# Patient Record
Sex: MALE
[De-identification: names, ages, dates, MRNs, and addresses within clinical notes are randomized per-mention and may not be internally consistent; named-entity substitution may affect disease eponyms.]

---

## 2019-10-07 ENCOUNTER — HOSPITAL ENCOUNTER (EMERGENCY)
Dept: HOSPITAL 97 - ER | Age: 44
Discharge: HOME | End: 2019-10-07
Payer: COMMERCIAL

## 2019-10-07 DIAGNOSIS — M10.9: Primary | ICD-10-CM

## 2019-10-07 PROCEDURE — 99283 EMERGENCY DEPT VISIT LOW MDM: CPT

## 2019-10-07 NOTE — ER
Nurse's Notes                                                                                     

 [facility]                                                                 

Name: Scar Castillo                                                                       

Age: 44 yrs                                                                                       

Sex: Male                                                                                         

: 1975                                                                                   

MRN: [id number]                                                                                   

Arrival Date: 10/07/2019                                                                          

Time: 14:08                                                                                       

Account#: [id number]                                                                            

Bed 9                                                                                             

Private MD:                                                                                       

Diagnosis: Gout                                                                                   

                                                                                                  

Presentation:                                                                                     

10/07                                                                                             

14:13 Presenting complaint: Patient states: right elbow swelling started Saturday; went to    [facility] ER and sent home with scripts but pain has not subsided. Transition of care:       

      patient was not received from another setting of care. Onset of symptoms was 2019. Risk Assessment: Do you want to hurt yourself or someone else? Patient            

      reports no desire to harm self or others. Care prior to arrival: None.                      

14:13 Method Of Arrival: Ambulatory                                                             

14:13 Acuity: BANDAR 4                                                                             

14:15 Initial Sepsis Screen: Does the patient meet any 2 criteria? No. Patient's initial      iw  

      sepsis screen is negative. Does the patient have a suspected source of infection? No.       

      Patient's initial sepsis screen is negative.                                                

                                                                                                  

Triage Assessment:                                                                                

14:15 General: Appears in no apparent distress. uncomfortable, Behavior is calm, cooperative, sv  

      appropriate for age. Pain: Complains of pain in right elbow. Neuro: Level of                

      Consciousness is awake, alert, obeys commands, Gait is steady. Respiratory: Respiratory     

      effort is even, unlabored.                                                                  

                                                                                                  

Historical:                                                                                       

- Allergies:                                                                                      

14:14 No Known Allergies;                                                                     sv  

- PMHx:                                                                                           

14:14 Gout;                                                                                   sv  

                                                                                                  

- Immunization history:: Adult Immunizations unknown.                                             

- Social history:: Smoking status: unknown.                                                       

- Ebola Screening: : Patient negative for fever greater than or equal to 101.5 degrees            

  Fahrenheit, and additional compatible Ebola Virus Disease symptoms Patient denies               

  exposure to infectious person Patient denies travel to an Ebola-affected area in the            

  21 days before illness onset No symptoms or risks identified at this time.                      

                                                                                                  

                                                                                                  

Screening:                                                                                        

15:38 Abuse screen: Denies threats or abuse. Denies injuries from another. Nutritional        iw  

      screening: No deficits noted. Tuberculosis screening: No symptoms or risk factors           

      identified. Fall Risk None identified.                                                      

                                                                                                  

Assessment:                                                                                       

15:00 General: Appears in no apparent distress. Behavior is calm, cooperative. Pain:          iw  

      Complains of pain in right elbow. Neuro: Level of Consciousness is awake, alert, obeys      

      commands, Oriented to person, place, time, situation, Moves all extremities.                

      Cardiovascular: Patient's skin is warm and dry. Respiratory: Respiratory effort is          

      even, unlabored, Respiratory pattern is regular. Derm: Skin is intact, is healthy with      

      good turgor. Musculoskeletal: Swelling present in right elbow.                              

                                                                                                  

Vital Signs:                                                                                      

14:14  / 86; Pulse 85; Resp 16; Temp 98.1; Pulse Ox 98% ; Weight 83.91 kg; Height 5 ft. sv  

      4 in. (162.56 cm);                                                                          

14:14 Body Mass Index 31.75 (83.91 kg, 162.56 cm)                                             sv  

                                                                                                  

ED Course:                                                                                        

14:08 Patient arrived in ED.                                                                  mr  

14:14 Triage completed.                                                                       sv  

14:14 Arm band placed on.                                                                     sv  

14:31 Neyda Silvestre, RN is Primary Nurse.                                                   iw  

14:38 Prasanna Botello PA is PHCP.                                                               jr8 

14:39 Carlos Bailon MD is Attending Physician.                                              jr8 

15:00 Patient has correct armband on for positive identification.                             iw  

15:38 No provider procedures requiring assistance completed. Patient did not have IV access   iw  

      during this emergency room visit.                                                           

                                                                                                  

Administered Medications:                                                                         

15:39 Drug: Indomethacin 50 mg Route: PO;                                                     jr8 

                                                                                                  

                                                                                                  

Outcome:                                                                                          

15:03 Discharge ordered by MD.                                                                jr8 

15:38 Discharged to home ambulatory.                                                          iw  

15:38 Condition: good                                                                             

15:38 Discharge instructions given to patient, Instructed on discharge instructions, follow       

      up and referral plans. Demonstrated understanding of instructions, follow-up care,          

      medications, Prescriptions given X 2.                                                       

15:39 Patient left the ED.                                                                    jr8 

                                                                                                  

Signatures:                                                                                       

Qing Parsons RN RN                                                      

Dilia Peterson                                 mr                                                   

Neyda Silvestre RN RN                                                      

Prasanna Botello PA PA   jr8                                                  

                                                                                                  

**************************************************************************************************

## 2019-10-07 NOTE — EDPHYS
Physician Documentation                                                                           

 [facility]                                                                 

Name: Scar Castillo                                                                       

Age: 44 yrs                                                                                       

Sex: Male                                                                                         

: 1975                                                                                   

MRN: [id number]                                                                                   

Arrival Date: 10/07/2019                                                                          

Time: 14:08                                                                                       

Account#: [id number]                                                                            

Bed 9                                                                                             

Private MD:                                                                                       

ED Physician Carlos Bailon                                                                       

HPI:                                                                                              

10/07                                                                                             

14:59 This 44 yrs old  Male presents to ER via Ambulatory with complaints of Elbow    jr8 

      Swelling.                                                                                   

14:59 Onset: The symptoms/episode began/occurred 1 week(s) ago. Associated signs and          jr8 

      symptoms: Pertinent negatives: fever. Pt seen at [facility] ED and DX with gout, given        

      three colcrys but pain is not better..                                                      

                                                                                                  

Historical:                                                                                       

- Allergies:                                                                                      

14:14 No Known Allergies;                                                                     sv  

- PMHx:                                                                                           

14:14 Gout;                                                                                   sv  

                                                                                                  

- Immunization history:: Adult Immunizations unknown.                                             

- Social history:: Smoking status: unknown.                                                       

- Ebola Screening: : Patient negative for fever greater than or equal to 101.5 degrees            

  Fahrenheit, and additional compatible Ebola Virus Disease symptoms Patient denies               

  exposure to infectious person Patient denies travel to an Ebola-affected area in the            

  21 days before illness onset No symptoms or risks identified at this time.                      

                                                                                                  

                                                                                                  

ROS:                                                                                              

15:00 Constitutional: Negative for fever, chills, and weight loss, Eyes: Negative for injury, jr8 

      pain, redness, and discharge, ENT: Negative for injury, pain, and discharge, Neck:          

      Negative for injury, pain, and swelling, Cardiovascular: Negative for chest pain,           

      palpitations, and edema, Respiratory: Negative for shortness of breath, cough,              

      wheezing, and pleuritic chest pain, Abdomen/GI: Negative for abdominal pain, nausea,        

      vomiting, diarrhea, and constipation, Back: Negative for injury and pain.                   

15:00 MS/extremity: Positive for pain, warmth, of the right elbow.                                

                                                                                                  

Exam:                                                                                             

15:00 Constitutional:  This is a well developed, well nourished patient who is awake, alert,  jr8 

      and in no acute distress. Head/Face:  Normocephalic, atraumatic. Eyes:  Pupils equal        

      round and reactive to light, extra-ocular motions intact.  Lids and lashes normal.          

      Conjunctiva and sclera are non-icteric and not injected.  Cornea within normal limits.      

      Periorbital areas with no swelling, redness, or edema. ENT:  Nares patent. No nasal         

      discharge, no septal abnormalities noted.  Tympanic membranes are normal and external       

      auditory canals are clear.  Oropharynx with no redness, swelling, or masses, exudates,      

      or evidence of obstruction, uvula midline.  Mucous membranes moist. Neck:  Trachea          

      midline, no thyromegaly or masses palpated, and no cervical lymphadenopathy.  Supple,       

      full range of motion without nuchal rigidity, or vertebral point tenderness.  No            

      Meningismus. Chest/axilla:  Normal chest wall appearance and motion.  Nontender with no     

      deformity.  No lesions are appreciated. Cardiovascular:  Regular rate and rhythm with a     

      normal S1 and S2.  No gallops, murmurs, or rubs.  Normal PMI, no JVD.  No pulse             

      deficits. Respiratory:  Lungs have equal breath sounds bilaterally, clear to                

      auscultation and percussion.  No rales, rhonchi or wheezes noted.  No increased work of     

      breathing, no retractions or nasal flaring. Abdomen/GI:  Soft, non-tender, with normal      

      bowel sounds.  No distension or tympany.  No guarding or rebound.  No evidence of           

      tenderness throughout.                                                                      

15:00 Musculoskeletal/extremity: ROM: intact in all extremities, Joints: the  right elbow         

      displays painful range of motion, swelling, redness, warmth.                                

                                                                                                  

Vital Signs:                                                                                      

14:14  / 86; Pulse 85; Resp 16; Temp 98.1; Pulse Ox 98% ; Weight 83.91 kg; Height 5 ft. sv  

      4 in. (162.56 cm);                                                                          

14:14 Body Mass Index 31.75 (83.91 kg, 162.56 cm)                                             sv  

                                                                                                  

MDM:                                                                                              

14:39 Patient medically screened.                                                             jr8 

15:01 Data reviewed: vital signs, nurses notes, and as a result, I will discharge patient.    jr8 

      Data interpreted: Pulse oximetry: on room air is 98 %. Interpretation: normal.              

      Counseling: I had a detailed discussion with the patient and/or guardian regarding: the     

      historical points, exam findings, and any diagnostic results supporting the                 

      discharge/admit diagnosis, the need for outpatient follow up, a family practitioner.        

                                                                                                  

Administered Medications:                                                                         

15:39 Drug: Indomethacin 50 mg Route: PO;                                                     jr8 

                                                                                                  

                                                                                                  

Disposition:                                                                                      

16:23 Co-signature as Attending Physician, Carlos Bailon MD I agree with the assessment and   kdr 

      plan of care.                                                                               

                                                                                                  

Disposition:                                                                                      

10/07/19 15:03 Discharged to Home. Impression: Gout.                                              

- Condition is Stable.                                                                            

- Discharge Instructions: Gout.                                                                   

- Prescriptions for indomethacin 50 mg Oral capsule - take 1 capsule by ORAL route 3              

  times per day with food as needed; 15 capsule. Medrol (Tyson) 4 mg Oral Tablets, Dose             

  Pack - take 1 tablet by ORAL route as directed - follow package instructions; 1                 

  packet.                                                                                         

- Work release form, Medication Reconciliation Form, Thank You Letter form.                       

- Follow up: Private Physician; When: As needed; Reason: Recheck today's complaints,              

  Re-evaluation by your physician.                                                                

- Problem is chronic.                                                                             

- Symptoms have worsened.                                                                         

                                                                                                  

                                                                                                  

                                                                                                  

Signatures:                                                                                       

Qing Parsons RN                    RN   sv                                                   

Carlos Bailon MD MD   kdr                                                  

Neyda Silvestre RN                     RN   iw                                                   

Prasanna Botello PA PA   jr8                                                  

                                                                                                  

Corrections: (The following items were deleted from the chart)                                    

15:39 15:03 10/07/2019 15:03 Discharged to Home. Impression: Gout. Condition is Stable. Forms jr8 

      are Medication Reconciliation Form, Thank You Letter, Antibiotic Education,                 

      Prescription Opioid Use. Follow up: Private Physician; When: As needed; Reason: Recheck     

      today's complaints, Re-evaluation by your physician. Problem is chronic. Symptoms have      

      worsened. jr8                                                                               

                                                                                                  

**************************************************************************************************